# Patient Record
Sex: FEMALE | Race: BLACK OR AFRICAN AMERICAN | NOT HISPANIC OR LATINO
[De-identification: names, ages, dates, MRNs, and addresses within clinical notes are randomized per-mention and may not be internally consistent; named-entity substitution may affect disease eponyms.]

---

## 2021-05-10 ENCOUNTER — TRANSCRIPTION ENCOUNTER (OUTPATIENT)
Age: 31
End: 2021-05-10

## 2021-06-09 ENCOUNTER — APPOINTMENT (OUTPATIENT)
Dept: DISASTER EMERGENCY | Facility: OTHER | Age: 31
End: 2021-06-09

## 2021-06-26 ENCOUNTER — APPOINTMENT (OUTPATIENT)
Dept: DISASTER EMERGENCY | Facility: OTHER | Age: 31
End: 2021-06-26

## 2021-07-17 ENCOUNTER — APPOINTMENT (OUTPATIENT)
Dept: DISASTER EMERGENCY | Facility: OTHER | Age: 31
End: 2021-07-17

## 2022-02-15 PROBLEM — Z00.00 ENCOUNTER FOR PREVENTIVE HEALTH EXAMINATION: Status: ACTIVE | Noted: 2022-02-15

## 2022-02-16 ENCOUNTER — APPOINTMENT (OUTPATIENT)
Dept: DERMATOLOGY | Facility: CLINIC | Age: 32
End: 2022-02-16

## 2022-11-16 ENCOUNTER — APPOINTMENT (OUTPATIENT)
Dept: INTERNAL MEDICINE | Facility: CLINIC | Age: 32
End: 2022-11-16

## 2022-11-16 VITALS
BODY MASS INDEX: 28.61 KG/M2 | DIASTOLIC BLOOD PRESSURE: 86 MMHG | SYSTOLIC BLOOD PRESSURE: 132 MMHG | OXYGEN SATURATION: 97 % | HEART RATE: 102 BPM | HEIGHT: 66 IN | WEIGHT: 178 LBS

## 2022-11-16 DIAGNOSIS — R11.10 VOMITING, UNSPECIFIED: ICD-10-CM

## 2022-11-16 DIAGNOSIS — K21.9 GASTRO-ESOPHAGEAL REFLUX DISEASE W/OUT ESOPHAGITIS: ICD-10-CM

## 2022-11-16 DIAGNOSIS — Z78.9 OTHER SPECIFIED HEALTH STATUS: ICD-10-CM

## 2022-11-16 PROCEDURE — 99203 OFFICE O/P NEW LOW 30 MIN: CPT

## 2022-11-16 NOTE — PHYSICAL EXAM

## 2022-11-16 NOTE — HISTORY OF PRESENT ILLNESS
[FreeTextEntry1] : "severe heartburn" and acid reflux. Has had problem for a while but last 6 weeks weeks worsening. Awakes her at night. Use pecid and tums PRN She does not eat late at night. occasional dysphagia. She feels food regurgitating\par  works for [employer]\par  1 child

## 2022-11-30 DIAGNOSIS — Z01.818 ENCOUNTER FOR OTHER PREPROCEDURAL EXAMINATION: ICD-10-CM

## 2023-01-20 ENCOUNTER — APPOINTMENT (OUTPATIENT)
Dept: INTERNAL MEDICINE | Facility: AMBULATORY MEDICAL SERVICES | Age: 33
End: 2023-01-20
Payer: COMMERCIAL

## 2023-01-20 PROCEDURE — 43239 EGD BIOPSY SINGLE/MULTIPLE: CPT

## 2023-02-06 ENCOUNTER — RX RENEWAL (OUTPATIENT)
Age: 33
End: 2023-02-06

## 2023-02-06 RX ORDER — FAMOTIDINE 20 MG/1
20 TABLET, FILM COATED ORAL
Qty: 180 | Refills: 0 | Status: ACTIVE | COMMUNITY
Start: 2022-11-16 | End: 1900-01-01

## 2023-02-10 ENCOUNTER — NON-APPOINTMENT (OUTPATIENT)
Age: 33
End: 2023-02-10

## 2024-01-06 ENCOUNTER — NON-APPOINTMENT (OUTPATIENT)
Age: 34
End: 2024-01-06

## 2024-05-03 ENCOUNTER — APPOINTMENT (OUTPATIENT)
Dept: PULMONOLOGY | Facility: CLINIC | Age: 34
End: 2024-05-03

## 2024-05-17 ENCOUNTER — APPOINTMENT (OUTPATIENT)
Dept: OTOLARYNGOLOGY | Facility: CLINIC | Age: 34
End: 2024-05-17

## 2024-05-19 ENCOUNTER — NON-APPOINTMENT (OUTPATIENT)
Age: 34
End: 2024-05-19

## 2024-06-07 ENCOUNTER — APPOINTMENT (OUTPATIENT)
Dept: OBGYN | Facility: CLINIC | Age: 34
End: 2024-06-07
Payer: COMMERCIAL

## 2024-06-07 VITALS
SYSTOLIC BLOOD PRESSURE: 122 MMHG | WEIGHT: 192 LBS | DIASTOLIC BLOOD PRESSURE: 80 MMHG | HEIGHT: 66 IN | BODY MASS INDEX: 30.86 KG/M2

## 2024-06-07 DIAGNOSIS — N93.9 ABNORMAL UTERINE AND VAGINAL BLEEDING, UNSPECIFIED: ICD-10-CM

## 2024-06-07 DIAGNOSIS — E28.2 POLYCYSTIC OVARIAN SYNDROME: ICD-10-CM

## 2024-06-07 PROCEDURE — 99203 OFFICE O/P NEW LOW 30 MIN: CPT

## 2024-06-07 PROCEDURE — 99459 PELVIC EXAMINATION: CPT

## 2024-06-07 RX ORDER — METFORMIN HYDROCHLORIDE 500 MG/1
500 TABLET, FILM COATED, EXTENDED RELEASE ORAL DAILY
Qty: 30 | Refills: 3 | Status: ACTIVE | COMMUNITY
Start: 2024-06-07 | End: 1900-01-01

## 2024-06-07 RX ORDER — NORETHINDRONE ACETATE AND ETHINYL ESTRADIOL 1; 20 MG/1; UG/1
1-20 TABLET ORAL
Qty: 3 | Refills: 1 | Status: ACTIVE | COMMUNITY
Start: 2024-06-07 | End: 1900-01-01

## 2024-06-07 NOTE — PHYSICAL EXAM
[Chaperone Present] : A chaperone was present in the examining room during all aspects of the physical examination [71119] : A chaperone was present during the pelvic exam. [Appropriately responsive] : appropriately responsive [Alert] : alert [No Acute Distress] : no acute distress [Soft] : soft [Non-tender] : non-tender [No Lesions] : no lesions [No Mass] : no mass

## 2024-06-07 NOTE — COUNSELING
[Nutrition/ Exercise/ Weight Management] : nutrition, exercise, weight management [Body Image] : body image [Vitamins/Supplements] : vitamins/supplements [Sunscreen] : sunscreen [Breast Self Exam] : breast self exam [Bladder Hygiene] : bladder hygiene [Confidentiality] : confidentiality

## 2024-06-07 NOTE — DISCUSSION/SUMMARY
[FreeTextEntry1] : 33 y.o  (LMP 26) presenting for evaluation of suspected PCOS   #Irregular menses - discussed etiologies of abnormal uterine bleeding including hormonal vs structural vs cytological - hormonal causes such as thyroid abnormalities, irregularities in hormones related to PCOS, and prolactin levels were reviewed. States baseline US at prior obgyn only consistent for polycystic ovaries. Will repeat PRL/Thyroid panel/testosterone/A1C today - Discussed that long-term metformin treatment of overweight-obese women with PCOS and normal baseline glycemic homeostasis resulted in reduction and stabilization of body mass, improvements of menstrual frequency and androgen profile and low conversion rate to diabetes. Metformin 500mg XL QD sent to pharmacy  #Contraception - she verbalized understanding and desire for [facility] OCP's to aid with menstrual regulation  - common side effects of combined OCPs were reviewed  - discussed typical effectiveness rates of 91%  - instructed in the correct use of OCPs and what to do in the event of missed dose  - counseled about the reduced contraceptive effectiveness if doses are missed and the recommendation to use condom protection for 1 week after the missed dose - counseled on the risk of blood clot and stroke, but that the risk of stroke from pregnancy outweighs that of combined OCP  - she denies history of blood clots, hypertension, CVA, migraine with aura, breast cancer, liver disease, gall bladder disease, and family history of first degree relative with VTE or CVA or GYN malignancy  - denies hx of or current tobacco use  - she is aware that combined oral contraception does not protect against sexually transmitted diseases and she was encouraged to use condoms with her contraceptive if she is at risk for an STD  - she was also told to call with any problematic side-effects to discuss management options or changing to another contraceptive method prior to discontinuing  - Junel 1/20 mg/mcg QD sent to pharmacy     RTO after 2024 for [facility] follow up and pap

## 2024-06-07 NOTE — HISTORY OF PRESENT ILLNESS
[N] : Patient does not use contraception [Y] : Positive pregnancy history [Regular Cycle Intervals] : periods have been regular [Frequency: Q ___ days] : menstrual periods occur approximately every [unfilled] days [Menarche Age: ____] : age at menarche was [unfilled] [FreeTextEntry1] : 33 y.o  (LMP 24) presenting for evaluation of suspected PCOS  Patient notes that after her c/s, she began experiencing irregular periods. At first, believed that it was related to post-pregnancy changes, but would go several months without regular cycles. In addition, started noticing hair growth along chin/upper lip with weight gain. Was evaluated by ob/gyn last year after going 3-4 months with no regular cycle, had pelvic US performed which demonstrated cystic ovaries. Was given progesterone to induce period and started on metformin which worked. Since that time, reports menses q month, but timing and duration fluctuates. In addition, discontinued metformin over the past several months with rebound weight gain Otherwise, no additional complaints. Sexually active with male partner. Uses condoms for birth control. Interested in possibly conceiving in 1-2 years.  Screening: - Pap (2024): WNL per patient  ObHx: pCS ( due to arrest of descent c/b ileus)- Female GynHx: Menses qmo, irregular timing  Denies hx of ovarian cysts, hx of fibroids, hx of endometriosis, hx of STD's PMH: Denies PSH: C/s x 1 ALL: NKDA SocHx: Lives with family. Feels safe at home. Denies depression or anxiety related symptoms. Social ETOH. Never used tobacco products, denies illicit drug use. FamHx: no significant family history of GYN malignancy or disease  [PGxTotal] : 1 [[facility]] : 1 [PGHxPremature] : 0 [PGHxAbortions] : 0 [[facility] Gilbertiving] : 1 [PGHxABInduced] : 0 [PGHxABSpont] : 0 [PGHxEctopic] : 0 [PGHxMultBirths] : 0

## 2024-06-11 ENCOUNTER — NON-APPOINTMENT (OUTPATIENT)
Age: 34
End: 2024-06-11

## 2024-06-11 LAB
ESTIMATED AVERAGE GLUCOSE: 120 MG/DL
HBA1C MFR BLD HPLC: 5.8 %
PROLACTIN SERPL-MCNC: 5.7 NG/ML
TESTOST FREE SERPL-MCNC: 4.2 PG/ML
TESTOST SERPL-MCNC: 60.5 NG/DL
TSH SERPL-ACNC: 2.18 UIU/ML

## 2024-06-26 ENCOUNTER — APPOINTMENT (OUTPATIENT)
Dept: OBGYN | Facility: CLINIC | Age: 34
End: 2024-06-26

## 2024-09-12 DIAGNOSIS — Z01.419 ENCOUNTER FOR GYNECOLOGICAL EXAMINATION (GENERAL) (ROUTINE) W/OUT ABNORMAL FINDINGS: ICD-10-CM

## 2024-09-13 ENCOUNTER — APPOINTMENT (OUTPATIENT)
Dept: OBGYN | Facility: CLINIC | Age: 34
End: 2024-09-13

## 2024-11-01 ENCOUNTER — APPOINTMENT (OUTPATIENT)
Dept: OBGYN | Facility: CLINIC | Age: 34
End: 2024-11-01